# Patient Record
Sex: FEMALE | Race: BLACK OR AFRICAN AMERICAN | NOT HISPANIC OR LATINO | URBAN - METROPOLITAN AREA
[De-identification: names, ages, dates, MRNs, and addresses within clinical notes are randomized per-mention and may not be internally consistent; named-entity substitution may affect disease eponyms.]

---

## 2019-06-28 ENCOUNTER — EMERGENCY (EMERGENCY)
Age: 19
LOS: 1 days | Discharge: LEFT BEFORE DISCHARGE | End: 2019-06-28
Admitting: EMERGENCY MEDICINE

## 2019-06-28 VITALS
RESPIRATION RATE: 20 BRPM | OXYGEN SATURATION: 100 % | HEART RATE: 60 BPM | SYSTOLIC BLOOD PRESSURE: 130 MMHG | DIASTOLIC BLOOD PRESSURE: 77 MMHG | TEMPERATURE: 98 F

## 2019-06-28 NOTE — ED PEDIATRIC NURSE REASSESSMENT NOTE - NS ED NURSE REASSESS COMMENT FT2
Pt. at bedside with daughter. Refusing at this time to go to adult ED. Patient encouraged to seek medical attention, but refusing at this time.
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
## 2019-06-28 NOTE — ED PEDIATRIC NURSE NOTE - EXPLANATION OF PATIENT'S REASON FOR LEAVING
Patient did not want to leave daughter to be evaluated. Patient strongly encouraged to seek medical care.